# Patient Record
(demographics unavailable — no encounter records)

---

## 2025-06-04 NOTE — PHYSICAL EXAM
[de-identified] :    Right Hip: Range of Motion in Degrees:                                                     Claimant:                                Normal: Flexion (Active)                         120                                         120-degrees Flexion (Passive)                      120                                         120-degrees Extension (Active)                     -30                                         -30-degrees Extension (Passive)                  -30                                         -30-degrees Abduction (Active)                    45-50                                      30-43-etuwaxd Abduction (Passive)                 45-50                                      30-97-muduxif Adduction (Active)                    20-30                                      72-29-nlxuknt Adduction (Passive)                 20-30                                      57-18-vzgszmf Internal Rotation (Active)         35                                           35-degrees Internal Rotation (Passive)      35                                           35-degrees External Rotation (Active)        45                                           45-degrees External Rotation (Passive)     45                                           45-degrees   No tenderness with internal or external rotation or axial load.  No tenderness to palpation over the greater trochanter.  Negative Trendelenburg.  No tenderness with resisted abduction.  No weakness to flexion, extension, abduction or adduction.  No evidence of instability.  No motor or sensory deficits.  2+ DP and PT pulses.  Skin is intact.  No scars, rashes or lesions.   Left Hip: Range of Motion in Degrees:                                                Claimant:               Normal: Flexion (Active)                     120                        120-degrees Flexion (Passive)                  120                        120-degrees Extension (Active)                 -30                         -30-degrees Extension (Passive)              -30                         -30-degrees Abduction (Active)                45-50                     40-39-vnmugio Abduction (Passive)             45-50                     79-69-uxgydpf Adduction (Active)                20-30                     74-51-rrpnvjj Adduction (Passive)             20-30                     62-84-rtruuui Internal Rotation (Active)     10                           35-degrees Internal Rotation (Passive)  10                           35-degrees External Rotation (Active)    45                          45-degrees External Rotation (Passive) 45                          45-degrees  Tenderness into the groin with internal and external rotation and axial load.  No tenderness to palpation over the greater trochanter.  Negative Trendelenburg.  No tenderness with resisted abduction.  No weakness to flexion, extension, abduction or adduction.  No evidence of instability.  No motor or sensory deficits.  2+ DP and PT pulses.  Skin is intact.  No scars, rashes or lesions.     [de-identified] : Gait and Station:  Ambulating with a slightly antalgic to antalgic gait.  Normal Station.  [de-identified] : Appearance:  Well-developed, well-nourished female in no acute distress.   [de-identified] : Radiographs, two to three views of the left hip and pelvis taken in the office today, show severe arthritis.

## 2025-06-04 NOTE — DISCUSSION/SUMMARY
[de-identified] : At this time, due to left hip osteoarthritis, I recommended she undergo an intra-articular injection.  She does not want to proceed with total hip arthroplasty at this time.

## 2025-06-04 NOTE — ADDENDUM
[FreeTextEntry1] : This note was written by Jaime Irvin on 06/02/2025, acting as a scribe for Jean-Pierre Robles III, MD

## 2025-06-04 NOTE — PHYSICAL EXAM
[de-identified] :    Right Hip: Range of Motion in Degrees:                                                     Claimant:                                Normal: Flexion (Active)                         120                                         120-degrees Flexion (Passive)                      120                                         120-degrees Extension (Active)                     -30                                         -30-degrees Extension (Passive)                  -30                                         -30-degrees Abduction (Active)                    45-50                                      18-00-auznlgl Abduction (Passive)                 45-50                                      62-40-wygtcts Adduction (Active)                    20-30                                      02-69-kibnfsq Adduction (Passive)                 20-30                                      27-32-okctpmn Internal Rotation (Active)         35                                           35-degrees Internal Rotation (Passive)      35                                           35-degrees External Rotation (Active)        45                                           45-degrees External Rotation (Passive)     45                                           45-degrees   No tenderness with internal or external rotation or axial load.  No tenderness to palpation over the greater trochanter.  Negative Trendelenburg.  No tenderness with resisted abduction.  No weakness to flexion, extension, abduction or adduction.  No evidence of instability.  No motor or sensory deficits.  2+ DP and PT pulses.  Skin is intact.  No scars, rashes or lesions.   Left Hip: Range of Motion in Degrees:                                                Claimant:               Normal: Flexion (Active)                     120                        120-degrees Flexion (Passive)                  120                        120-degrees Extension (Active)                 -30                         -30-degrees Extension (Passive)              -30                         -30-degrees Abduction (Active)                45-50                     62-61-octxifg Abduction (Passive)             45-50                     86-55-lxhbmrq Adduction (Active)                20-30                     69-94-rfxosve Adduction (Passive)             20-30                     36-25-eecyfkx Internal Rotation (Active)     10                           35-degrees Internal Rotation (Passive)  10                           35-degrees External Rotation (Active)    45                          45-degrees External Rotation (Passive) 45                          45-degrees  Tenderness into the groin with internal and external rotation and axial load.  No tenderness to palpation over the greater trochanter.  Negative Trendelenburg.  No tenderness with resisted abduction.  No weakness to flexion, extension, abduction or adduction.  No evidence of instability.  No motor or sensory deficits.  2+ DP and PT pulses.  Skin is intact.  No scars, rashes or lesions.     [de-identified] : Gait and Station:  Ambulating with a slightly antalgic to antalgic gait.  Normal Station.  [de-identified] : Appearance:  Well-developed, well-nourished female in no acute distress.   [de-identified] : Radiographs, two to three views of the left hip and pelvis taken in the office today, show severe arthritis.
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
## 2025-06-04 NOTE — DISCUSSION/SUMMARY
[de-identified] : At this time, due to left hip osteoarthritis, I recommended she undergo an intra-articular injection.  She does not want to proceed with total hip arthroplasty at this time.
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
